# Patient Record
Sex: FEMALE | Race: WHITE | NOT HISPANIC OR LATINO
[De-identification: names, ages, dates, MRNs, and addresses within clinical notes are randomized per-mention and may not be internally consistent; named-entity substitution may affect disease eponyms.]

---

## 2024-04-16 ENCOUNTER — APPOINTMENT (OUTPATIENT)
Dept: ORTHOPEDIC SURGERY | Facility: CLINIC | Age: 64
End: 2024-04-16
Payer: COMMERCIAL

## 2024-04-16 DIAGNOSIS — C34.90 MALIGNANT NEOPLASM OF UNSPECIFIED PART OF UNSPECIFIED BRONCHUS OR LUNG: ICD-10-CM

## 2024-04-16 PROCEDURE — 20550 NJX 1 TENDON SHEATH/LIGAMENT: CPT | Mod: RT

## 2024-04-16 PROCEDURE — 64450 NJX AA&/STRD OTHER PN/BRANCH: CPT | Mod: RT

## 2024-04-16 PROCEDURE — 26055 INCISE FINGER TENDON SHEATH: CPT | Mod: F7

## 2024-04-16 PROCEDURE — 99214 OFFICE O/P EST MOD 30 MIN: CPT | Mod: 57

## 2024-04-16 RX ORDER — PEMBROLIZUMAB 50 MG/2ML
INJECTION, POWDER, LYOPHILIZED, FOR SOLUTION INTRAVENOUS
Refills: 0 | Status: ACTIVE | COMMUNITY

## 2024-04-16 NOTE — IMAGING
[de-identified] : right middle finger TTP a1 pulley +triggering otherwise farom neurovascular intact distally  Xrays declined today

## 2024-04-16 NOTE — ASSESSMENT
[FreeTextEntry1] : The patient was advised of the diagnosis. The natural history of the pathology was explained in full to the patient in layman's terms. All questions were answered. The risks and benefits of surgical and non-surgical treatment alternatives were explained in full to the patient.  We again reviewed the anatomy of the flexor sheath and pathology of trigger fingers with the use of drawings and discussion.  The patient has failed injections/nonoperative treatment.  We discussed the possibility of surgery including the use of a percutaneous versus an open trigger finger release.  We discussed that too many injections may lead to weakening of the tendon/tendon rupture and that surgery is indicated at this point.  The patient would like to proceed with a percutaneous procedure.  The patient understands the relative success rate of percutaneous versus open procedures.  They also understand that despite the minimally invasive nature, a percutaneous procedure is still surgery with its resultant scar tissue/possibility for stiffness.  Risks include bleeding, infection, injury to nerves, vessels, tendons, stiffness, pain, loss of range of motion, loss of function, CRPS.  We discussed the surgical plan and post procedure expectations.  We also discussed the possibility of prolonged pain/scar tissue and the possible need for therapy.  The patient is amenable to the risks, had the opportunity to ask questions, all questions were answered and the patient agreed.  After sterile prep, the region of the A1 pulley and digital nerves were injected with 3mL of plain lidocaine.  After several minutes, an 18 gauge needle was inserted through the anesthetized area and used to incise the tendon sheath at the A1 pulley.  6mg of bethamethasone was then injected in the region of the tendon sheath for post procedure swelling.  This was tolerated well and wound was dressed with a sterile band aid.  Right middle trigger finger percutaneous release performed today RTO in 4 weeks for f/u care.

## 2024-04-16 NOTE — HISTORY OF PRESENT ILLNESS
[de-identified] : 04/16/2024: Patient is here for evaluation of right middle finger. Patient notes she has been seen before for trigger finger, has stiffness and pain.  Pt requests right middle finger percutaneous release.  PMH: Lung CA Allergies: nkda

## 2024-05-16 ENCOUNTER — APPOINTMENT (OUTPATIENT)
Dept: ORTHOPEDIC SURGERY | Facility: CLINIC | Age: 64
End: 2024-05-16
Payer: COMMERCIAL

## 2024-05-16 VITALS — BODY MASS INDEX: 15.06 KG/M2 | HEIGHT: 63 IN | WEIGHT: 85 LBS

## 2024-05-16 PROCEDURE — 99213 OFFICE O/P EST LOW 20 MIN: CPT

## 2024-05-16 RX ORDER — DICLOFENAC SODIUM 1% 10 MG/G
1 GEL TOPICAL DAILY
Qty: 1 | Refills: 3 | Status: ACTIVE | COMMUNITY
Start: 2024-05-16 | End: 1900-01-01

## 2024-05-16 RX ORDER — METHYLPREDNISOLONE 4 MG/1
4 TABLET ORAL
Qty: 1 | Refills: 0 | Status: ACTIVE | COMMUNITY
Start: 2024-05-16 | End: 1900-01-01

## 2024-05-16 NOTE — HISTORY OF PRESENT ILLNESS
[de-identified] : 5/16/24:  Pt was given CSI in right middle finger at last visit and no longer has triggering but pain recurred.  04/16/2024: Patient is here for evaluation of right middle finger. Patient notes she has been seen before for trigger finger, has stiffness and pain.  Pt requests right middle finger percutaneous release.  PMH: Lung CA Allergies: nkda

## 2024-05-16 NOTE — ASSESSMENT
[FreeTextEntry1] : The patient was advised of the diagnosis. The natural history of the pathology was explained in full to the patient in layman's terms. All questions were answered. The risks and benefits of surgical and non-surgical treatment alternatives were explained in full to the patient.  Pt had unrelated procedure and cannot have CSI Pt will try Voltaren Pt will ask PCP if she can take MDP F/u in 2 months

## 2024-05-16 NOTE — IMAGING
[de-identified] : right middle finger TTP a1 pulley no triggering otherwise farom neurovascular intact distally  Xrays declined today

## 2024-06-20 ENCOUNTER — APPOINTMENT (OUTPATIENT)
Dept: ORTHOPEDIC SURGERY | Facility: CLINIC | Age: 64
End: 2024-06-20
Payer: COMMERCIAL

## 2024-06-20 DIAGNOSIS — M65.331 TRIGGER FINGER, RIGHT MIDDLE FINGER: ICD-10-CM

## 2024-06-20 PROCEDURE — 99214 OFFICE O/P EST MOD 30 MIN: CPT | Mod: 25

## 2024-06-20 PROCEDURE — 20550 NJX 1 TENDON SHEATH/LIGAMENT: CPT | Mod: RT

## 2024-06-20 NOTE — HISTORY OF PRESENT ILLNESS
[de-identified] : 06/20/2024: Patient has taken MDP and voltaren. Patient reports clicking and swelling still present.   5/16/24:  Pt was given CSI in right middle finger at last visit and no longer has triggering but pain recurred.  04/16/2024: Patient is here for evaluation of right middle finger. Patient notes she has been seen before for trigger finger, has stiffness and pain.  Pt requests right middle finger percutaneous release.  PMH: Lung CA Allergies: nkda

## 2024-06-20 NOTE — ASSESSMENT
[FreeTextEntry1] : We reviewed the anatomy of the flexor sheath and pathology of trigger fingers with the use of drawings and discussion.  We discussed the treatment options including splinting/nsaids, injection and surgery.  We discussed that too many injections may lead to weakening o the tendon/tendon rupture and the safety of two injections. After a discussion of the risks, benefits and alternatives along with the expectations, the patient was amenable to injection.  The indication for injection is pain and inflammation.  The skin overlying the tendon sheath/A1 pulley site was prepared with alcohol and ethyl chloride was sprayed topically.  Sterile technique was used. An injection of 1ml of lidocaine and 6mg of betamethasone was used.  The patient was instructed to call if redness, pain or fever occur.  They ay apply ice for 15 minutes eery hour for the next 12-24 hours as tolerated.  .  The patient understands that it may take 2-5 days to see a noticeable difference.  Sterile Band-Aid was applied.  Pt was given TF CSI in right middle finger today.

## 2024-06-20 NOTE — IMAGING
[de-identified] : right middle finger TTP a1 pulley no triggering otherwise farom neurovascular intact distally

## 2024-07-18 ENCOUNTER — APPOINTMENT (OUTPATIENT)
Dept: ORTHOPEDIC SURGERY | Facility: CLINIC | Age: 64
End: 2024-07-18